# Patient Record
Sex: MALE | Race: WHITE | NOT HISPANIC OR LATINO | URBAN - METROPOLITAN AREA
[De-identification: names, ages, dates, MRNs, and addresses within clinical notes are randomized per-mention and may not be internally consistent; named-entity substitution may affect disease eponyms.]

---

## 2022-04-12 ENCOUNTER — VIRTUAL VISIT (OUTPATIENT)
Dept: UROLOGY | Facility: CLINIC | Age: 42
End: 2022-04-12
Payer: COMMERCIAL

## 2022-04-12 DIAGNOSIS — Z30.09 VASECTOMY EVALUATION: Primary | ICD-10-CM

## 2022-04-12 PROCEDURE — 99202 OFFICE O/P NEW SF 15 MIN: CPT | Mod: GT | Performed by: UROLOGY

## 2022-04-12 RX ORDER — INDOMETHACIN 50 MG/1
50 CAPSULE ORAL
COMMUNITY
Start: 2021-07-22

## 2022-04-12 RX ORDER — ALLOPURINOL 100 MG/1
300 TABLET ORAL
COMMUNITY
Start: 2021-12-08

## 2022-04-12 RX ORDER — ATENOLOL 25 MG/1
TABLET ORAL
COMMUNITY
Start: 2022-03-08

## 2022-04-12 NOTE — PROGRESS NOTES
VASECTOMY CONSULTATION NOTE  DATE OF VISIT: 4/12/2022  DC TEJEDA   PATIENT NAME: James Padilla    YOB: 1980      REASON FOR CONSULTATION: Mr. James Padilla is a 41 year old year old gentleman who is seen today requesting a vasectomy. He has 1 children - 14 year old son - and he wishes to have a vasectomy for birth control.     PAST MEDICAL HISTORY: No past medical history on file.    PAST SURGICAL HISTORY: No past surgical history on file.    MEDICATIONS:   Current Outpatient Medications:      allopurinol (ZYLOPRIM) 100 MG tablet, , Disp: , Rfl:      atenolol (TENORMIN) 25 MG tablet, , Disp: , Rfl:      indomethacin (INDOCIN) 50 MG capsule, Take 50 mg by mouth, Disp: , Rfl:     ALLERGIES:   Allergies   Allergen Reactions     Ketoconazole Rash       FAMILY HISTORY: No family history on file.    SOCIAL HISTORY:   Social History     Socioeconomic History     Marital status:      Spouse name: Not on file     Number of children: Not on file     Years of education: Not on file     Highest education level: Not on file   Occupational History     Not on file   Tobacco Use     Smoking status: Not on file     Smokeless tobacco: Not on file   Substance and Sexual Activity     Alcohol use: Not on file     Drug use: Not on file     Sexual activity: Not on file   Other Topics Concern     Not on file   Social History Narrative     Not on file     Social Determinants of Health     Financial Resource Strain: Not on file   Food Insecurity: Not on file   Transportation Needs: Not on file   Physical Activity: Not on file   Stress: Not on file   Social Connections: Not on file   Intimate Partner Violence: Not on file   Housing Stability: Not on file       PHYSICAL EXAM  Patient is a 41 year old  male   Vitals: There were no vitals taken for this visit.  There is no height or weight on file to calculate BMI.  General Appearance Adult:   Alert, no acute distress, oriented  HENT: throat/mouth:normal, good  dentition  Lungs: no respiratory distress, or pursed lip breathing  Heart: No obvious jugular venous distension present  Abdomen: obesely - distended  Musculoskeltal: extremities normal, no peripheral edema  Skin: no suspicious lesions or rashes  Neuro: Alert, oriented, speech and mentation normal  Psych: affect and mood normal  Gait: Normal     DIAGNOSIS: Request for sterilization    PLAN: The risks of the procedure as well as expectations for recovery and outcomes were explained in detail to him.  He was counseled on the risks for bleeding infection and pain after the procedure. We discussed the risk of post-vasectomy pain syndrome.  He was instructed to continue to use contraception until he had proven azoospermia on a semen specimen.  This would normally be collected at least 3 months after the procedure. Also discussed the rare, but possible risk of re-canalization of the vas, even after successful vasectomy with sterile semen specimen.  He was instructed to hold all anticoagulants medications for one week prior to the procedure.  It was recommended that he have someone else drive him home after his vasectomy.  In light of these risks and expectations he would like to proceed.  We are scheduling a vasectomy in the office in the near future.    Pt. Understands:  -1/1000-1/3000 risk of future pregnancy even with perfectly done vasectomy  -vasectomy is a permanent procedure    -he may cryopreserve sperm if he wishes   -1-5% risk of post-vasectomy pain syndrome   -1-5% risk of complication, primarily infection or bleeding  - he needs to have a semen sample that shows no sperm before getting approval for unprotected intercourse.      Thank you for the kind consultation.    Time spent: 15 minutes spent on the date of the encounter doing chart review, history and exam, documentation and further activities as noted above.     Sharad Moreno MD   Urology  [facility]  Clinic Phone  [phone]      James Padilla  who is being evaluated via a billable video visit.      How would you like to obtain your AVS? Mail a copy  If the video visit is dropped, the invitation should be resent by: Text to cell phone: [phone]  Will anyone else be joining your video visit? No    Video-Visit Details    Type of service:  Video Visit    Video Start Time: 2:18 PM    Video End Time:2:25 PM    Originating Location (pt. Location): Home    Distant Location (provider location):  [facility]     Platform used for Video Visit: Clever Machine

## 2022-05-29 ENCOUNTER — HEALTH MAINTENANCE LETTER (OUTPATIENT)
Age: 42
End: 2022-05-29

## 2022-06-06 NOTE — PROGRESS NOTES
"Assessment:      ICD-10-CM    1. Arthritis of midfoot  M19.079 meloxicam (MOBIC) 15 MG tablet   2. Capsulitis of metatarsophalangeal (MTP) joint of right foot  M77.51    3. Equinus contracture of right ankle  M24.571         Plan:  No orders of the defined types were placed in this encounter.      Discussed the etiology and treatment of the condition with the patient.  Imaging studies reviewed and discussed with the patient.  Discussed surgical and conservative options.    -Injection- x-ray guided to 3rd TMTJ if needed - his area of max pain today.   -NSAID- Rx po Meloxicam. Stop Indomethacin when taking  -Orthoses-   Custom- Rx today  -Activity- calf muscle stretching  Midfoot fusion if needed discussed      Return:  No follow-ups on file.    Solange Mccormack DPM                Chief Complaint:     Patient presents with:  Foot Problems     right foot pain    HPI:  James Padilla is a 41 year old year old male who presents for evaluation of foot pain.    Pain location- dorsal midfoot  States he has had >1 gout attack in this area  2nd toe is feeling much better, no pain currently  Did yard work & aggravated R foot recently - swollen today    Past Medical & Surgical History:  No past medical history on file.   No past surgical history on file.   No family history on file.     Social History:  ?  History   Smoking Status     Not on file   Smokeless Tobacco     Not on file     History   Drug Use Not on file     Social History    Substance and Sexual Activity      Alcohol use: Not on file      Allergies:  ?   Allergies   Allergen Reactions     Ketoconazole Rash        Medications:    Current Outpatient Medications   Medication     meloxicam (MOBIC) 15 MG tablet     allopurinol (ZYLOPRIM) 100 MG tablet     atenolol (TENORMIN) 25 MG tablet     indomethacin (INDOCIN) 50 MG capsule     No current facility-administered medications for this visit.       Physical Exam:  ?  Vitals:  /74   Ht 1.753 m (5' 9\")   Wt " 106.6 kg (235 lb)   BMI 34.70 kg/m     General:  WD/WN, in NAD.  A&O x3.  Dermatologic:    Skin is intact, open lesions absent.   Skin texture, turgor is normal.  Vascular:  Pulses palpable bilateral.  Digital capillary refill time normal bilateral.  Skin temperature is normal right.  Generalized edema- trace bilateral.  Focal edema- mild dorsal midfoot right.  Neurologic:    Gross sensation normal.  Gait and balance normal.  Musculoskeletal:  Maximal pain to palpation of dorsal midfoot over TMTJs Right.  3rd TMTJ ROM > 2nd TMTJ ROM painful, R  no pain to palpation of 2nd MPJ, ankle, sinus tarsi, right.  Lachmann drawer stable, not painful, 2nd MPJ, R  Ankle dorsiflexion <10 degrees with the knee extended, <0 degrees with the knee flexed.  Muscle strength 5/5  foot and ankle right.      Stance:  RCSP Valgus bilateral.      Imaging:   x-ray independently reviewed and interpreted by myself today.  Weight-bearing views right fot dated 4/2022, and MRI R foot reveal metatarsus adductus & flatfoot w/ 2nd TMTJ degeneration > 3rd.  2nd digit rectus.  2nd MPJ effusion & attenuation of medial capsule.  2nd TMTJ subchondral edema, mild amt to 3rd TMTJ.

## 2022-06-07 ENCOUNTER — OFFICE VISIT (OUTPATIENT)
Dept: PODIATRY | Facility: CLINIC | Age: 42
End: 2022-06-07
Payer: COMMERCIAL

## 2022-06-07 VITALS
BODY MASS INDEX: 34.8 KG/M2 | WEIGHT: 235 LBS | SYSTOLIC BLOOD PRESSURE: 122 MMHG | HEIGHT: 69 IN | DIASTOLIC BLOOD PRESSURE: 74 MMHG

## 2022-06-07 DIAGNOSIS — M24.571 EQUINUS CONTRACTURE OF RIGHT ANKLE: ICD-10-CM

## 2022-06-07 DIAGNOSIS — M19.079 ARTHRITIS OF MIDFOOT: Primary | ICD-10-CM

## 2022-06-07 DIAGNOSIS — M77.51 CAPSULITIS OF METATARSOPHALANGEAL (MTP) JOINT OF RIGHT FOOT: ICD-10-CM

## 2022-06-07 PROCEDURE — 99204 OFFICE O/P NEW MOD 45 MIN: CPT | Performed by: PODIATRIST

## 2022-06-07 RX ORDER — MELOXICAM 15 MG/1
15 TABLET ORAL DAILY
Qty: 28 TABLET | Refills: 1 | Status: SHIPPED | OUTPATIENT
Start: 2022-06-07 | End: 2022-06-14

## 2022-06-07 NOTE — PATIENT INSTRUCTIONS
PATIENT INSTRUCTIONS - Podiatry / Foot & Ankle Surgery      Meloxicam- 1 pill daily x1 week, then take a 1 week break.  Repeat as needed.  Take with food & an acid blocker if stomach upset.  Stop all other NSAIDs (aspirin, ibuprofen/Motrin, naproxen/ Aleve).      [place] [facility] LOCATIONS  [facility]  [address]  [place] [address]  Phone: [phone]  Fax: [phone] [facility]  [address]  [place] [address]  Phone: [phone]   Fax: [phone]   [facility]  [address]  [place] [address]  Phone: [phone]  Fax: [phone] [facility]  [address]  [place] [address]  Phone: [phone]   Fax: [phone]   [facility]   [address]  [place] [address]  Phone: [phone]  Fax: [phone] * Please call any location listed to make an appointment for a casting/fitting. Your referral was sent to their central office and they will all have the order on file.         Calf muscle stretching 2-3x daily as instructed, both with the knees straight and the knees bent. Hold for 30-60 seconds.  For personal instruction on this,  please request a Physical Therapy referral from your doctor.      X-ray guided injection to midfoot - Right   Call for procedural slot

## 2022-06-07 NOTE — LETTER
6/7/2022         RE: James Padilla  [address] [facility] [address]        Dear Colleague,    Thank you for referring your patient, James Padilla, to the [facility]. Please see a copy of my visit note below.    Assessment:      ICD-10-CM    1. Arthritis of midfoot  M19.079 meloxicam (MOBIC) 15 MG tablet   2. Capsulitis of metatarsophalangeal (MTP) joint of right foot  M77.51    3. Equinus contracture of right ankle  M24.571         Plan:  No orders of the defined types were placed in this encounter.      Discussed the etiology and treatment of the condition with the patient.  Imaging studies reviewed and discussed with the patient.  Discussed surgical and conservative options.    -Injection- x-ray guided to 3rd TMTJ if needed - his area of max pain today.   -NSAID- Rx po Meloxicam. Stop Indomethacin when taking  -Orthoses-   Custom- Rx today  -Activity- calf muscle stretching  Midfoot fusion if needed discussed      Return:  No follow-ups on file.    Solange Mccormack DPM                Chief Complaint:     Patient presents with:  Foot Problems     right foot pain    HPI:  James Padilla is a 41 year old year old male who presents for evaluation of foot pain.    Pain location- dorsal midfoot  States he has had >1 gout attack in this area  2nd toe is feeling much better, no pain currently  Did yard work & aggravated R foot recently - swollen today    Past Medical & Surgical History:  No past medical history on file.   No past surgical history on file.   No family history on file.     Social History:  ?  History   Smoking Status     Not on file   Smokeless Tobacco     Not on file     History   Drug Use Not on file     Social History    Substance and Sexual Activity      Alcohol use: Not on file      Allergies:  ?   Allergies   Allergen Reactions     Ketoconazole Rash        Medications:    Current Outpatient Medications   Medication     meloxicam (MOBIC) 15 MG  "tablet     allopurinol (ZYLOPRIM) 100 MG tablet     atenolol (TENORMIN) 25 MG tablet     indomethacin (INDOCIN) 50 MG capsule     No current facility-administered medications for this visit.       Physical Exam:  ?  Vitals:  /74   Ht 1.753 m (5' 9\")   Wt 106.6 kg (235 lb)   BMI 34.70 kg/m     General:  WD/WN, in NAD.  A&O x3.  Dermatologic:    Skin is intact, open lesions absent.   Skin texture, turgor is normal.  Vascular:  Pulses palpable bilateral.  Digital capillary refill time normal bilateral.  Skin temperature is normal right.  Generalized edema- trace bilateral.  Focal edema- mild dorsal midfoot right.  Neurologic:    Gross sensation normal.  Gait and balance normal.  Musculoskeletal:  Maximal pain to palpation of dorsal midfoot over TMTJs Right.  3rd TMTJ ROM > 2nd TMTJ ROM painful, R  no pain to palpation of 2nd MPJ, ankle, sinus tarsi, right.  Lachmann drawer stable, not painful, 2nd MPJ, R  Ankle dorsiflexion <10 degrees with the knee extended, <0 degrees with the knee flexed.  Muscle strength 5/5  foot and ankle right.      Stance:  RCSP Valgus bilateral.      Imaging:   x-ray independently reviewed and interpreted by myself today.  Weight-bearing views right fot dated 4/2022, and MRI R foot reveal metatarsus adductus & flatfoot w/ 2nd TMTJ degeneration > 3rd.  2nd digit rectus.  2nd MPJ effusion & attenuation of medial capsule.  2nd TMTJ subchondral edema, mild amt to 3rd TMTJ.                  Again, thank you for allowing me to participate in the care of your patient.        Sincerely,        Solange Mccormack DPM    "

## 2022-06-22 ENCOUNTER — OFFICE VISIT (OUTPATIENT)
Dept: PODIATRY | Facility: CLINIC | Age: 42
End: 2022-06-22
Payer: COMMERCIAL

## 2022-06-22 VITALS — DIASTOLIC BLOOD PRESSURE: 74 MMHG | SYSTOLIC BLOOD PRESSURE: 122 MMHG

## 2022-06-22 DIAGNOSIS — M19.079 ARTHRITIS OF MIDFOOT: Primary | ICD-10-CM

## 2022-06-22 PROCEDURE — 20600 DRAIN/INJ JOINT/BURSA W/O US: CPT | Mod: RT | Performed by: PODIATRIST

## 2022-06-22 RX ORDER — BUPIVACAINE HYDROCHLORIDE 5 MG/ML
1 INJECTION, SOLUTION PERINEURAL ONCE
Status: COMPLETED | OUTPATIENT
Start: 2022-06-22 | End: 2022-06-22

## 2022-06-22 RX ORDER — DEXAMETHASONE SODIUM PHOSPHATE 4 MG/ML
4 INJECTION, SOLUTION INTRA-ARTICULAR; INTRALESIONAL; INTRAMUSCULAR; INTRAVENOUS; SOFT TISSUE ONCE
Status: COMPLETED | OUTPATIENT
Start: 2022-06-22 | End: 2022-06-22

## 2022-06-22 RX ADMIN — BUPIVACAINE HYDROCHLORIDE 5 MG: 5 INJECTION, SOLUTION PERINEURAL at 14:26

## 2022-06-22 RX ADMIN — DEXAMETHASONE SODIUM PHOSPHATE 4 MG: 4 INJECTION, SOLUTION INTRA-ARTICULAR; INTRALESIONAL; INTRAMUSCULAR; INTRAVENOUS; SOFT TISSUE at 17:07

## 2022-06-22 NOTE — PATIENT INSTRUCTIONS
"PATIENT INSTRUCTIONS - Podiatry / Foot & Ankle Surgery        Aftercare for Steroid Injection  Activity:  -Resume normal activity as tolerated.  -Tendon, ligament, fascia injectionons- avoid high-impact activity for 1 week.  Icing:  -May apply to the injection site if needed.  Infection:  -Risk is generally low (<1%), but must be aware of the signs/symptoms.    -Both infection and an inflammatory reaction to the steroid (\"steroid flare\") will cause redness, warmth, and increased pain.   -If these symptoms develop within 12-24h & resolve within 2-3 days- \"steroid flare\"- ice (non-worrisome)  -If these symptoms develop after 24-48h, progressively get worse, & are associated with a fever- possible infection; call the clinic or present to urgent care immediately      "

## 2022-06-22 NOTE — PROGRESS NOTES
Assessment:      ICD-10-CM    1. Arthritis of midfoot  M19.079 XR Foot Right 2 Views     DRAIN/INJECT SMALL JOINT/BURSA     FLUOROSCOPIC GUIDANCE NEEDLE PLACEMENT     dexamethasone (DECADRON) injection 4 mg     Bupivacaine 0.5 % Injection        Plan:  Orders Placed This Encounter   Procedures     DRAIN/INJECT SMALL JOINT/BURSA     FLUOROSCOPIC GUIDANCE NEEDLE PLACEMENT     XR Foot Right 2 Views       Discussed the etiology and treatment of the condition with the patient.  Imaging studies reviewed and discussed with the patient.  Discussed surgical and conservative options.        Procedure: X-ray guided injection  PARQ session held, verbal consent obtained.  Field block just proximal joint for anesthesia & hemostasis using 2.5ml 2% lidocaine with Epi.  Sterile skin prep.    X-ray utilized to localized the affected small joint in the foot.   Imaging is necessary due the close anatomic proximity of the foot joints making landmark guided injection not reliable.  Location:  2nd TMTJ  Contents:  2ml total, marcaine, dexamethasone phosphate.  Dressing applied.    Post-injection instructions reviewed including close attention to amount and duration of pain relief.      Return:  No follow-ups on file.    Solange Mccormack DPM                Chief Complaint:     Patient presents with:  RECHECK     right foot pain    HPI:  James Padilla is a 41 year old year old male who presents for evaluation of foot pain.    Pain location- dorsal midfoot  States he has had >1 gout attack in this area  2nd toe is feeling much better, no pain currently  Did yard work & aggravated R foot recently - swollen today    Past Medical & Surgical History:  No past medical history on file.   No past surgical history on file.   No family history on file.     Social History:  ?  History   Smoking Status     Not on file   Smokeless Tobacco     Not on file     History   Drug Use Not on file     Social History    Substance and Sexual Activity       Alcohol use: Not on file      Allergies:  ?   Allergies   Allergen Reactions     Ketoconazole Rash        Medications:    Current Outpatient Medications   Medication     allopurinol (ZYLOPRIM) 100 MG tablet     atenolol (TENORMIN) 25 MG tablet     indomethacin (INDOCIN) 50 MG capsule     meloxicam (MOBIC) 15 MG tablet     Current Facility-Administered Medications   Medication     dexamethasone (DECADRON) injection 4 mg       Physical Exam:  ?  Vitals:  /74    General:  WD/WN, in NAD.  A&O x3.  Dermatologic:    Skin is intact, open lesions absent.   Skin texture, turgor is normal.  Vascular:  Pulses palpable bilateral.  Digital capillary refill time normal bilateral.  Skin temperature is normal right.  Generalized edema- trace bilateral.  Focal edema- mild dorsal midfoot right.  Neurologic:    Gross sensation normal.  Gait and balance normal.  Musculoskeletal:  Maximal pain to palpation of dorsal midfoot over TMTJs Right.  2nd TMTJ ROM painful, R  no pain to palpation of 2nd MPJ, ankle, sinus tarsi, right.  Lachmann drawer stable, not painful, 2nd MPJ, R  Ankle dorsiflexion <10 degrees with the knee extended, <0 degrees with the knee flexed.  Muscle strength 5/5  foot and ankle right.      Stance:  RCSP Valgus bilateral.      Imaging:   x-ray independently reviewed and interpreted by myself today.  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  2nd digit rectus.  2nd MPJ effusion & attenuation of medial capsule.  2nd TMTJ subchondral edema, mild amt to 3rd TMTJ.

## 2022-06-22 NOTE — LETTER
6/22/2022         RE: James Padilla  [address] [facility] [address]        Dear Colleague,    Thank you for referring your patient, James Padilla, to the [facility]. Please see a copy of my visit note below.    Assessment:      ICD-10-CM    1. Arthritis of midfoot  M19.079 XR Foot Right 2 Views        Plan:  Orders Placed This Encounter   Procedures     XR Foot Right 2 Views       Discussed the etiology and treatment of the condition with the patient.  Imaging studies reviewed and discussed with the patient.  Discussed surgical and conservative options.        Procedure: X-ray guided injection  PARQ session held, verbal consent obtained.  Field block just proximal joint for anesthesia & hemostasis using 2.5ml 2% lidocaine with Epi.  Sterile skin prep.    X-ray utilized to localized the affected small joint in the foot.   Imaging is necessary due the close anatomic proximity of the foot joints making landmark guided injection not reliable.  Location:  2nd TMTJ  Contents:  2ml total, marcaine, dexamethasone phosphate.  Dressing applied.    Post-injection instructions reviewed including close attention to amount and duration of pain relief.      Return:  No follow-ups on file.    Solange Mccormack DPM                Chief Complaint:     No chief complaint on file.     right foot pain    HPI:  James Padilla is a 41 year old year old male who presents for evaluation of foot pain.    Pain location- dorsal midfoot  States he has had >1 gout attack in this area  2nd toe is feeling much better, no pain currently  Did yard work & aggravated R foot recently - swollen today    Past Medical & Surgical History:  No past medical history on file.   No past surgical history on file.   No family history on file.     Social History:  ?  History   Smoking Status     Not on file   Smokeless Tobacco     Not on file     History   Drug Use Not on file     Social History    Substance and Sexual  Activity      Alcohol use: Not on file      Allergies:  ?   Allergies   Allergen Reactions     Ketoconazole Rash        Medications:    Current Outpatient Medications   Medication     allopurinol (ZYLOPRIM) 100 MG tablet     atenolol (TENORMIN) 25 MG tablet     indomethacin (INDOCIN) 50 MG capsule     meloxicam (MOBIC) 15 MG tablet     No current facility-administered medications for this visit.       Physical Exam:  ?  Vitals:  There were no vitals taken for this visit.   General:  WD/WN, in NAD.  A&O x3.  Dermatologic:    Skin is intact, open lesions absent.   Skin texture, turgor is normal.  Vascular:  Pulses palpable bilateral.  Digital capillary refill time normal bilateral.  Skin temperature is normal right.  Generalized edema- trace bilateral.  Focal edema- mild dorsal midfoot right.  Neurologic:    Gross sensation normal.  Gait and balance normal.  Musculoskeletal:  Maximal pain to palpation of dorsal midfoot over TMTJs Right.  2nd TMTJ ROM painful, R  no pain to palpation of 2nd MPJ, ankle, sinus tarsi, right.  Lachmann drawer stable, not painful, 2nd MPJ, R  Ankle dorsiflexion <10 degrees with the knee extended, <0 degrees with the knee flexed.  Muscle strength 5/5  foot and ankle right.      Stance:  RCSP Valgus bilateral.      Imaging:   x-ray independently reviewed and interpreted by myself today.  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  2nd digit rectus.  2nd MPJ effusion & attenuation of medial capsule.  2nd TMTJ subchondral edema, mild amt to 3rd TMTJ.                Again, thank you for allowing me to participate in the care of your patient.        Sincerely,        Solange Mccormack DPM

## 2022-06-29 ENCOUNTER — MYC MEDICAL ADVICE (OUTPATIENT)
Dept: PODIATRY | Facility: CLINIC | Age: 42
End: 2022-06-29

## 2022-06-30 NOTE — TELEPHONE ENCOUNTER
I used Dexamethasone only, I did not use a crystalline steroid (Kenalog) that can trigger a steroid flare.    I have never heard of a gout attack after a clear / non-crystalline steroid.    I am glad it is better today, if it has not improved by tomorrow please have him see me at [place].    You are free to double book him over any existing patient if necessary.    Solange Mccormack, TREVOR

## 2022-06-30 NOTE — TELEPHONE ENCOUNTER
Phone call to patient and provided recommendations from Dr. Mccormack.   He asks if this is something we typically see that we have trouble diagnosing. He has not had this severe of a flare before.   Discussed that it may be a good idea to go ahead and schedule a follow up appointment and discuss his concerns further and to be re-evaluated.   He was in agreement.   Appointment scheduled at the [facility] location 7/1/22 . Asked that he arrive between 8:15 and 8:30 and that he was being double booked and worked in, so he may have to wait a bit to be seen. He verbalized understanding.     JAIDEN Ferraro RN

## 2022-06-30 NOTE — TELEPHONE ENCOUNTER
Please see Mychart message.   There is no consent to communicate on file.   Left voicemail asking for a return call and replied to [employer] message asking for a return call.     JAIDEN Ferraro RN

## 2022-06-30 NOTE — TELEPHONE ENCOUNTER
Please see [employer] message.   Patient had xray guided cortisone injection of 2nd TMTJ on 6/22.22 by Dr. Mccormack.     Patient returns call.   He states that on the evening of 6/28/22 his foot started to become red and painful. In the am of 6/29/22 the pain, swelling and redness were worse. The redness involves the whole front half of his foot, arch and toes. His foot is swollen and it is painful to bear weight.   Today the redness is less in intensity and pain is a little better. He feels it is just like his gout attacks in the past.   Will discuss with provider for recommendations and get back with him.     Ok to leave message: YES    Please advise.     JAIDEN Ferraro RN

## 2022-07-05 ENCOUNTER — MYC MEDICAL ADVICE (OUTPATIENT)
Dept: PODIATRY | Facility: CLINIC | Age: 42
End: 2022-07-05

## 2022-07-08 ENCOUNTER — OFFICE VISIT (OUTPATIENT)
Dept: PODIATRY | Facility: CLINIC | Age: 42
End: 2022-07-08
Payer: COMMERCIAL

## 2022-07-08 VITALS — HEART RATE: 84 BPM | DIASTOLIC BLOOD PRESSURE: 88 MMHG | SYSTOLIC BLOOD PRESSURE: 126 MMHG

## 2022-07-08 DIAGNOSIS — M19.079 ARTHRITIS OF MIDFOOT: Primary | ICD-10-CM

## 2022-07-08 DIAGNOSIS — M24.571 EQUINUS CONTRACTURE OF RIGHT ANKLE: ICD-10-CM

## 2022-07-08 DIAGNOSIS — M77.51 CAPSULITIS OF METATARSOPHALANGEAL (MTP) JOINT OF RIGHT FOOT: ICD-10-CM

## 2022-07-08 PROCEDURE — 99214 OFFICE O/P EST MOD 30 MIN: CPT | Performed by: PODIATRIST

## 2022-07-08 NOTE — PATIENT INSTRUCTIONS
"PATIENT INSTRUCTIONS - Podiatry / Foot & Ankle Surgery    [facility]  [facility]  [address]  [place] [address]  Phone: [phone]  Fax: [phone] [facility]  [address]  [place] [address]  Phone: [phone]   Fax: [phone]   [facility]  [address]  [place] [address]  Phone: [phone]  Fax: [phone] [facility]  [address]  [place] [address]  Phone: [phone]   Fax: [phone]   [facility]   [address]  [place] [address]  Phone: [phone]  Fax: [phone] * Please call any location listed to make an appointment for a casting/fitting. Your referral was sent to their central office and they will all have the order on file.         Diclofenac Gel- Apply to affected area only.  Apply 3-4 times daily for the first 3-4 days, then 1-2 times daily as needed.       Stop Meloxicam for precedure  Call if oral Diclofenac (alternative to meloxicam) prescription is desired        Inside shoes w/ orthotic footbed:  Samy, Eliza, Halflinger, Mekhi, Ketracie, etc  or similar    Place metatarsal pads on an orthotic or shoe liner, adhesive side down.    The pad should contact your foot BEHIND the metatarsophalangeal joints (\"MPJs\" or \"ball\") of your foot.   If the pad sits beneath the joints themselves, it may actually increase pain.  Use glue to prevent shifting of the metatarsal pads on the orthotic / shoe liner.  Available on [email or web address] (Blue PPT metatarsal pads) or amazon        "

## 2022-07-08 NOTE — PROGRESS NOTES
Assessment:      ICD-10-CM    1. Arthritis of midfoot  M19.079 [facility] Orth[facility]; Foot Orthotics   2. Capsulitis of metatarsophalangeal (MTP) joint of right foot  M77.51 [facility] Orth[facility]; Foot Orthotics   3. Equinus contracture of right ankle  M24.571 [facility] Orth[facility]; Foot Orthotics        Plan:  Orders Placed This Encounter   Procedures     [facility] DME Orthotic; Foot Orthotics       Discussed the etiology and treatment of the condition with the patient.  Imaging studies reviewed and discussed with the patient.  Discussed surgical and conservative options.    Pain in midfoot improved, not gone  2nd MPJ still painful    -Rx orthotics today  - met pad & arch for arthritis  -NSAID - call if diclofenac Rx desired  -Surgical - midfoot fusion, 2nd MPJ repair/ decompression discussed      Return:  No follow-ups on file.    Solange Mccormack DPM                Chief Complaint:     Patient presents with:  Right Foot - Pain, RECHECK: Had injection, had 2 good days after still not improved ball of foot still sore      right foot pain    HPI:  James Padilla is a 41 year old year old male who presents for evaluation of foot pain.    Pain location- dorsal midfoot  States he has had >1 gout attack in this area  2nd toe is feeling much better, no pain currently  Did yard work & aggravated R foot recently - swollen today    Past Medical & Surgical History:  No past medical history on file.   No past surgical history on file.   No family history on file.     Social History:  ?  History   Smoking Status     Current Every Day Smoker     Types: Cigarettes   Smokeless Tobacco     Never Used     History   Drug Use Not on file     Social History    Substance and Sexual Activity      Alcohol use: Not on file      Allergies:  ?   Allergies   Allergen Reactions     Ketoconazole Rash        Medications:    Current Outpatient Medications   Medication      allopurinol (ZYLOPRIM) 100 MG tablet     atenolol (TENORMIN) 25 MG tablet     indomethacin (INDOCIN) 50 MG capsule     meloxicam (MOBIC) 15 MG tablet     No current facility-administered medications for this visit.       Physical Exam:  ?  Vitals:  /88   Pulse 84    General:  WD/WN, in NAD.  A&O x3.  Dermatologic:    Skin is intact, open lesions absent.   Skin texture, turgor is normal.  Vascular:  Pulses palpable bilateral.  Digital capillary refill time normal bilateral.  Skin temperature is normal right.  Generalized edema- trace bilateral.  Focal edema- mild dorsal midfoot right.  Neurologic:    Gross sensation normal.  Gait and balance normal.  Musculoskeletal:  Moderate pain to palpation of dorsal midfoot over 2nd  TMTJ, sub 2nd MPJ, Right.  2nd TMTJ ROM painful, R  2nd MPJ ROM mildly painful, R  Lachmann drawer stable, not painful, 2nd MPJ, R  Ankle dorsiflexion <10 degrees with the knee extended, <0 degrees with the knee flexed.  Muscle strength 5/5  foot and ankle right.      Stance:  RCSP Valgus bilateral.      Imaging:   x-ray independently reviewed and interpreted by myself today.  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  2nd digit rectus.  2nd MPJ effusion & attenuation of medial capsule.  2nd TMTJ subchondral edema, mild amt to 3rd TMTJ.

## 2022-07-08 NOTE — LETTER
7/8/2022         RE: James Padilla  [address] [facility] [address]        Dear Colleague,    Thank you for referring your patient, James Padilla, to the [facility]. Please see a copy of my visit note below.    Assessment:      ICD-10-CM    1. Arthritis of midfoot  M19.079 [facility] D[facility] Orthotic; Foot Orthotics   2. Capsulitis of metatarsophalangeal (MTP) joint of right foot  M77.51 [facility] DME Orthotic; Foot Orthotics   3. Equinus contracture of right ankle  M24.571 [facility] DME Orthotic; Foot Orthotics        Plan:  Orders Placed This Encounter   Procedures     [facility]otics and Prosthetics DME Orthotic; Foot Orthotics       Discussed the etiology and treatment of the condition with the patient.  Imaging studies reviewed and discussed with the patient.  Discussed surgical and conservative options.    Pain in midfoot improved, not gone  2nd MPJ still painful    -Rx orthotics today  - met pad & arch for arthritis  -NSAID - call if diclofenac Rx desired  -Surgical - midfoot fusion, 2nd MPJ repair/ decompression discussed      Return:  No follow-ups on file.    Solange Mccormack DPM                Chief Complaint:     Patient presents with:  Right Foot - Pain, RECHECK: Had injection, had 2 good days after still not improved ball of foot still sore      right foot pain    HPI:  James Padilla is a 41 year old year old male who presents for evaluation of foot pain.    Pain location- dorsal midfoot  States he has had >1 gout attack in this area  2nd toe is feeling much better, no pain currently  Did yard work & aggravated R foot recently - swollen today    Past Medical & Surgical History:  No past medical history on file.   No past surgical history on file.   No family history on file.     Social History:  ?  History   Smoking Status     Current Every Day Smoker     Types: Cigarettes   Smokeless Tobacco     Never Used     History    Drug Use Not on file     Social History    Substance and Sexual Activity      Alcohol use: Not on file      Allergies:  ?   Allergies   Allergen Reactions     Ketoconazole Rash        Medications:    Current Outpatient Medications   Medication     allopurinol (ZYLOPRIM) 100 MG tablet     atenolol (TENORMIN) 25 MG tablet     indomethacin (INDOCIN) 50 MG capsule     meloxicam (MOBIC) 15 MG tablet     No current facility-administered medications for this visit.       Physical Exam:  ?  Vitals:  /88   Pulse 84    General:  WD/WN, in NAD.  A&O x3.  Dermatologic:    Skin is intact, open lesions absent.   Skin texture, turgor is normal.  Vascular:  Pulses palpable bilateral.  Digital capillary refill time normal bilateral.  Skin temperature is normal right.  Generalized edema- trace bilateral.  Focal edema- mild dorsal midfoot right.  Neurologic:    Gross sensation normal.  Gait and balance normal.  Musculoskeletal:  Moderate pain to palpation of dorsal midfoot over 2nd  TMTJ, sub 2nd MPJ, Right.  2nd TMTJ ROM painful, R  2nd MPJ ROM mildly painful, R  Lachmann drawer stable, not painful, 2nd MPJ, R  Ankle dorsiflexion <10 degrees with the knee extended, <0 degrees with the knee flexed.  Muscle strength 5/5  foot and ankle right.      Stance:  RCSP Valgus bilateral.      Imaging:   x-ray independently reviewed and interpreted by myself today.  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  2nd digit rectus.  2nd MPJ effusion & attenuation of medial capsule.  2nd TMTJ subchondral edema, mild amt to 3rd TMTJ.                Again, thank you for allowing me to participate in the care of your patient.        Sincerely,        Solange Mccormack DPM

## 2022-07-15 ENCOUNTER — OFFICE VISIT (OUTPATIENT)
Dept: UROLOGY | Facility: CLINIC | Age: 42
End: 2022-07-15
Payer: COMMERCIAL

## 2022-07-15 DIAGNOSIS — Z30.2 ENCOUNTER FOR STERILIZATION: Primary | ICD-10-CM

## 2022-07-15 PROCEDURE — 88302 TISSUE EXAM BY PATHOLOGIST: CPT | Performed by: PATHOLOGY

## 2022-07-15 PROCEDURE — 55250 REMOVAL OF SPERM DUCT(S): CPT | Performed by: UROLOGY

## 2022-07-15 NOTE — LETTER
Date:July 15, 2022      Provider requested that no letter be sent. Do not send.       [facility]

## 2022-07-15 NOTE — PROGRESS NOTES
OFFICE VASECTOMY OPERATIVE NOTE  [facility]    DATE: 07/15/22  PATIENT: James Padilla    YOB: 1980    James Padilla is a 41 year old male.  He has 0 children and he wishes a vasectomy for birth control.  He has read the brochure and he has shaved himself.  I reviewed the vasectomy procedure with him explaining that it would be done with a local anesthetic given just in the location where the vasectomy would be done.  It would be done through incisions with the removal of segments of the vasa, cauterization of the ends, and burying the ends separate with sutures.      Pt. Understands:  -1/1000-1/3000 risk of future pregnancy even with perfectly done vasectomy  -vasectomy is a permanent procedure    -he may cryopreserve sperm if he wishes   -1-5% risk of post-vasectomy pain syndrome   -1-5% risk of complication, primarily infection or bleeding  - he needs to have a semen sample that shows no sperm before getting approval for unprotected intercourse.      Complications such as bleeding, infection, and damage to other tissues in the area were discussed. I recommended that an ice bag be placed on the scrotum off and on tonight to help reduce pain and swelling.      He was reminded that he was not sterile immediately after the vasectomy that it would take at least 20 ejaculations to empty the vas of any remaining sperm.  He was not to provide a semen sample until after the 20th ejaculation and not before 12 weeks after the vas. He was  to fulfill both of those requirements.   He understands it is his responsibility to find out the results of the vas before proceeding with intercourse without birth control protection.  Other items discussed were activity afterwards, returning to work, voluntary physical activity,  resuming sexual activity, clothing to wear, bathing, and care of the vas site and expected changes in the site as healing progresses.  After signing the permit, bilateral vasectomy was done as  described below.     ANESTHESIA: Local    DETAILS OF PROCEDURE: The risks of the procedure were explained in detail to the patient and informed consent was obtained. The patient was placed supine on the procedure table and the penis and scrotum were prepped and draped in the standard sterile fashion. The right vas deferens was isolated and brought up to the skin. 1% lidocaine local anesthesia was used to infiltrate the skin and the spermatic cord. A small incision was created and adventitial tissues were swept away from the vas. A 1 cm segment of the vas was excised and sent for pathology. The proximal and distal lumina of the vas were cauterized and then each segment was tied off in a knuckling-fashion with a 3-0 vicryl suture. Hemostasis was ensured and the segments were released back into the scrotum. Meticulous hemostasis was achieved. The skin was closed with 3-0 chromic suture in a horizontal mattress fashion. Next the left vas was brought to the skin and a vasectomy was performed in the similar fashion. The skin was closed with 3-0 chromic suture in a horizontal mattress fashion.    COMPLICATIONS: None    TAKE HOME MEDICATIONS: Tylenol every 6 hours, PRN    DISMISSAL INSTRUCTIONS:  - Ice pack to scrotum 15 to 20 minutes each hour awake for 36 to 40 hours.  - No strenuous activity or ejaculation for at least 7 days.  - No unprotected sexual activity until proven azoospermia on semen samples at 3 months.  - Referred to patient handout for normal postop expectations and indications to contact nurse or physician.    M.D.: Sharad Moreno MD

## 2022-07-15 NOTE — PROGRESS NOTES
Pt has signed the consent form today confirming that a VASECTOMY is the correct procedure today. I verbally confirmed the patient s identity using two indicators, that the pt has started any medication as prescribed for this procedure, relevant allergies, that they have not used blood thinning products in the last 7 - 10 days, and that the correct equipment was available. Immediately prior to starting the procedure I conducted the Time Out with the MD and re-confirmed the patient s name and procedure. Pathology ordered and sent to lab. Post-procedure information, also specimen collection cup with instructions, given to pt at time of check out.    The following medication was given:     MEDICATION:  Xylocaine 2%   ROUTE: infiltration  SITE: R AND L vas deferens  DOSE: 400mg/20ML  LOT #: [id number]  : [employer]  EXPIRATION DATE: 3/26  NDC#: 63658-376-74   Was there drug waste? Yes  Amount of drug waste (mL): 5.  Reason for waste:  Single use vial  Multi-dose vial: No    SHARON Bosch CMA

## 2022-07-15 NOTE — LETTER
7/15/2022       RE: James Padilla  [address]  [facility] [address]     Dear Colleague,    Thank you for referring your patient, James Padilla, to the [facility]. Please see a copy of my visit note below.    OFFICE VASECTOMY OPERATIVE NOTE  [facility]    DATE: 07/15/22  PATIENT: James Padilla    YOB: 1980    James Padilla is a 41 year old male.  He has 0 children and he wishes a vasectomy for birth control.  He has read the brochure and he has shaved himself.  I reviewed the vasectomy procedure with him explaining that it would be done with a local anesthetic given just in the location where the vasectomy would be done.  It would be done through incisions with the removal of segments of the vasa, cauterization of the ends, and burying the ends separate with sutures.      Pt. Understands:  -1/1000-1/3000 risk of future pregnancy even with perfectly done vasectomy  -vasectomy is a permanent procedure    -he may cryopreserve sperm if he wishes   -1-5% risk of post-vasectomy pain syndrome   -1-5% risk of complication, primarily infection or bleeding  - he needs to have a semen sample that shows no sperm before getting approval for unprotected intercourse.      Complications such as bleeding, infection, and damage to other tissues in the area were discussed. I recommended that an ice bag be placed on the scrotum off and on tonight to help reduce pain and swelling.      He was reminded that he was not sterile immediately after the vasectomy that it would take at least 20 ejaculations to empty the vas of any remaining sperm.  He was not to provide a semen sample until after the 20th ejaculation and not before 12 weeks after the vas. He was  to fulfill both of those requirements.   He understands it is his responsibility to find out the results of the vas before proceeding with intercourse without birth  control protection.  Other items discussed were activity afterwards, returning to work, voluntary physical activity,  resuming sexual activity, clothing to wear, bathing, and care of the vas site and expected changes in the site as healing progresses.  After signing the permit, bilateral vasectomy was done as described below.     ANESTHESIA: Local    DETAILS OF PROCEDURE: The risks of the procedure were explained in detail to the patient and informed consent was obtained. The patient was placed supine on the procedure table and the penis and scrotum were prepped and draped in the standard sterile fashion. The right vas deferens was isolated and brought up to the skin. 1% lidocaine local anesthesia was used to infiltrate the skin and the spermatic cord. A small incision was created and adventitial tissues were swept away from the vas. A 1 cm segment of the vas was excised and sent for pathology. The proximal and distal lumina of the vas were cauterized and then each segment was tied off in a knuckling-fashion with a 3-0 vicryl suture. Hemostasis was ensured and the segments were released back into the scrotum. Meticulous hemostasis was achieved. The skin was closed with 3-0 chromic suture in a horizontal mattress fashion. Next the left vas was brought to the skin and a vasectomy was performed in the similar fashion. The skin was closed with 3-0 chromic suture in a horizontal mattress fashion.    COMPLICATIONS: None    TAKE HOME MEDICATIONS: Tylenol every 6 hours, PRN    DISMISSAL INSTRUCTIONS:  - Ice pack to scrotum 15 to 20 minutes each hour awake for 36 to 40 hours.  - No strenuous activity or ejaculation for at least 7 days.  - No unprotected sexual activity until proven azoospermia on semen samples at 3 months.  - Referred to patient handout for normal postop expectations and indications to contact nurse or physician.    M.D.: Sharad Moreno MD          Again, thank you for allowing me to participate in the care  of your patient.      Sincerely,    Sharad Moreno MD

## 2022-07-15 NOTE — PATIENT INSTRUCTIONS
POST VASECTOMY INSTRUCTIONS    1.) If you have any concerns or questions, please contact our office at [phone] and choose option 2.      2.) It is okay to take a shower, however, do not soak in water (bath,swimming, hot tub,etc....) until your incision is healed.    3.) You might notice some swelling, mild bruising, and discomfort for several days after your vasectomy. This is to be expected. For at least the next 24 hours, an ice pack should be applied for 20 minutes every hour that you are awake. Ice will help with discomfort and swelling. Do not place directly on the skin.    4.) No intercourse, strenuous activity or exercise for at least 7-10 days, even if you feel fine.    5.) You need to wear good scrotal support while you are healing. We strongly recommend an athletic supporter or a pair of regular briefs that are one size too small. Boxer briefs do not offer enough support.    6.) Tylenol as directed on the bottle is preferred for discomfort. Please avoid any blood thinning products such as ibuprofen and aspirin (Motrin, Advil, Excedrin, Aleve, ect..) for at least the next week.    7.) It is normal to have mild drainage from the incision area for several days. However, please contact our office if you notice: bright red blood that does not stop after three days, increased pain, heat at the incision, red streaks, foul smelling discharge, or if you start to run a fever.     8.) YOU MUST CONTINUE BIRTH CONTROL UNTIL WE CONFIRM YOUR STERILITY.  This process can take up to a year to complete (rare occurrence).     9.) You have been given a form with specimen cup and instructions for your follow up specimen. You will be cleared once we receive ONE negative specimen. If your specimen comes back positive (sperm seen) you will be asked to repeat the test. This does not mean that your vasectomy has failed.

## 2022-07-19 LAB
PATH REPORT.COMMENTS IMP SPEC: NORMAL
PATH REPORT.COMMENTS IMP SPEC: NORMAL
PATH REPORT.FINAL DX SPEC: NORMAL
PATH REPORT.GROSS SPEC: NORMAL
PATH REPORT.MICROSCOPIC SPEC OTHER STN: NORMAL
PATH REPORT.RELEVANT HX SPEC: NORMAL
PHOTO IMAGE: NORMAL

## 2022-07-22 ENCOUNTER — OFFICE VISIT (OUTPATIENT)
Dept: PODIATRY | Facility: CLINIC | Age: 42
End: 2022-07-22
Payer: COMMERCIAL

## 2022-07-22 VITALS
SYSTOLIC BLOOD PRESSURE: 122 MMHG | WEIGHT: 247 LBS | BODY MASS INDEX: 36.48 KG/M2 | DIASTOLIC BLOOD PRESSURE: 80 MMHG | HEART RATE: 76 BPM

## 2022-07-22 DIAGNOSIS — M21.621 TAILOR'S BUNIONETTE, RIGHT: ICD-10-CM

## 2022-07-22 DIAGNOSIS — M77.51 CAPSULITIS OF METATARSOPHALANGEAL (MTP) JOINT OF RIGHT FOOT: ICD-10-CM

## 2022-07-22 DIAGNOSIS — M19.079 ARTHRITIS OF MIDFOOT: Primary | ICD-10-CM

## 2022-07-22 PROCEDURE — 99214 OFFICE O/P EST MOD 30 MIN: CPT | Performed by: PODIATRIST

## 2022-07-22 ASSESSMENT — PAIN SCALES - GENERAL: PAINLEVEL: NO PAIN (1)

## 2022-07-22 NOTE — LETTER
7/22/2022         RE: James Padilla  [address] [facility] [address]        Dear Colleague,    Thank you for referring your patient, James Padilla, to the [facility]. Please see a copy of my visit note below.    Assessment:      ICD-10-CM    1. Arthritis of midfoot  M19.079    2. Capsulitis of metatarsophalangeal (MTP) joint of right foot  M77.51    3. Tailor's bunionette, right  M21.621         Plan:  No orders of the defined types were placed in this encounter.      Discussed the etiology and treatment of the condition with the patient.  Imaging studies reviewed and discussed with the patient.  Discussed surgical and conservative options.      Midfoot arthritis & prior gout attacks is priority  Tailor's bunion - on & off  2nd MPJ - came after midfoot    -Surgical - midfoot fusion, tailor's bunionectomy, 2nd MPJ repair / decompression discussed  -Continue current care        Return:  No follow-ups on file.    Solange Mccormack DPM                Chief Complaint:     No chief complaint on file.     right foot pain    S: Went off melaxopan last week and had increased pain.    HPI:  James Padilla is a 41 year old year old male who presents for evaluation of foot pain.    Pain location- dorsal midfoot  States he has had >1 gout attack in this area  2nd toe is feeling much better, no pain currently  Did yard work & aggravated R foot recently - swollen today    Past Medical & Surgical History:  No past medical history on file.   No past surgical history on file.   No family history on file.     Social History:  ?  History   Smoking Status     Current Every Day Smoker     Types: Cigarettes   Smokeless Tobacco     Never Used     History   Drug Use Not on file     Social History    Substance and Sexual Activity      Alcohol use: Not on file      Allergies:  ?   Allergies   Allergen Reactions     Ketoconazole Rash        Medications:    Current Outpatient Medications   Medication      allopurinol (ZYLOPRIM) 100 MG tablet     atenolol (TENORMIN) 25 MG tablet     indomethacin (INDOCIN) 50 MG capsule     meloxicam (MOBIC) 15 MG tablet     No current facility-administered medications for this visit.       Physical Exam:  ?  Vitals:  /80   Pulse 76   Wt 112 kg (247 lb)   BMI 36.48 kg/m     General:  WD/WN, in NAD.  A&O x3.  Dermatologic:    Skin is intact, open lesions absent.   Skin texture, turgor is normal.  Vascular:  Pulses palpable bilateral.  Digital capillary refill time normal bilateral.  Skin temperature is normal right.  Generalized edema- trace bilateral.  Focal edema- mild dorsal midfoot right.  Neurologic:    Gross sensation normal.  Gait and balance normal.  Musculoskeletal:  Moderate pain to palpation of dorsal midfoot over 2nd TMTJ,  Right.  Mild pain to palpation sub 5th MPJ, 2nd MPJ, R  2nd TMTJ ROM painful, R  2nd MPJ ROM mildly painful, R  Lachmann drawer stable, not painful, 2nd MPJ, R  Ankle dorsiflexion <10 degrees with the knee extended, <0 degrees with the knee flexed.  Muscle strength 5/5  foot and ankle right.      Stance:  RCSP Valgus bilateral.  Rectus hallux, mild 5th digit adductus      Imaging:   x-ray independently reviewed and interpreted by myself today.  Weight-bearing views right foot dated 4/2022, reveals 2nd & 3rd TMTJ arthritis.  1/2 DAMIAN increased despite rectus 1st MPJ.  Tailor's bunion present,     MRI R foot dated reveal metatarsus adductus & flatfoot w/ 2nd TMTJ degeneration > 3rd.  2nd digit rectus.  2nd MPJ effusion & attenuation of medial capsule.  2nd TMTJ subchondral edema, mild amt to 3rd TMTJ.              Again, thank you for allowing me to participate in the care of your patient.        Sincerely,        Solange Mccormack DPM

## 2022-07-22 NOTE — PATIENT INSTRUCTIONS
PATIENT INSTRUCTIONS - Podiatry / Foot & Ankle Surgery    Surgery Scheduling  Please call the surgery coordinator to schedule surgery at:  [phone]  The surgery coordinator will also arrange a pre-op exam with your PCP for surgical clearance.  Please return to see your surgeon for a pre-op exam within 30 days of your scheduled procedure.      Midfoot fusion, possible tailor's bunionectomy, 2nd MPJ repair   Recovery - 6 weeks no weight, then 2-4 weeks in a boot   No impact x3 months      Calf muscle stretching 2-3x daily as instructed, both with the knees straight and the knees bent. Hold for 30-60 seconds.  For personal instruction on this,  please request a Physical Therapy referral from your doctor.      Have orthotics adjusted as much as possible until they feel great

## 2022-07-22 NOTE — PROGRESS NOTES
Assessment:      ICD-10-CM    1. Arthritis of midfoot  M19.079    2. Capsulitis of metatarsophalangeal (MTP) joint of right foot  M77.51    3. Romeo's bunionette, right  M21.621         Plan:  No orders of the defined types were placed in this encounter.      Discussed the etiology and treatment of the condition with the patient.  Imaging studies reviewed and discussed with the patient.  Discussed surgical and conservative options.      Midfoot arthritis & prior gout attacks is priority  Romeo's bunion - on & off  2nd MPJ - came after midfoot    -Surgical - midfoot fusion, tailor's bunionectomy, 2nd MPJ repair / decompression discussed  -Continue current care        Return:  No follow-ups on file.    Solange Mccormack DPM                Chief Complaint:     No chief complaint on file.     right foot pain    S: Went off melaxopan last week and had increased pain.    HPI:  James Padilla is a 41 year old year old male who presents for evaluation of foot pain.    Pain location- dorsal midfoot  States he has had >1 gout attack in this area  2nd toe is feeling much better, no pain currently  Did yard work & aggravated R foot recently - swollen today    Past Medical & Surgical History:  No past medical history on file.   No past surgical history on file.   No family history on file.     Social History:  ?  History   Smoking Status     Current Every Day Smoker     Types: Cigarettes   Smokeless Tobacco     Never Used     History   Drug Use Not on file     Social History    Substance and Sexual Activity      Alcohol use: Not on file      Allergies:  ?   Allergies   Allergen Reactions     Ketoconazole Rash        Medications:    Current Outpatient Medications   Medication     allopurinol (ZYLOPRIM) 100 MG tablet     atenolol (TENORMIN) 25 MG tablet     indomethacin (INDOCIN) 50 MG capsule     meloxicam (MOBIC) 15 MG tablet     No current facility-administered medications for this visit.       Physical Exam:   ?  Vitals:  /80   Pulse 76   Wt 112 kg (247 lb)   BMI 36.48 kg/m     General:  WD/WN, in NAD.  A&O x3.  Dermatologic:    Skin is intact, open lesions absent.   Skin texture, turgor is normal.  Vascular:  Pulses palpable bilateral.  Digital capillary refill time normal bilateral.  Skin temperature is normal right.  Generalized edema- trace bilateral.  Focal edema- mild dorsal midfoot right.  Neurologic:    Gross sensation normal.  Gait and balance normal.  Musculoskeletal:  Moderate pain to palpation of dorsal midfoot over 2nd TMTJ,  Right.  Mild pain to palpation sub 5th MPJ, 2nd MPJ, R  2nd TMTJ ROM painful, R  2nd MPJ ROM mildly painful, R  Lachmann drawer stable, not painful, 2nd MPJ, R  Ankle dorsiflexion <10 degrees with the knee extended, <0 degrees with the knee flexed.  Muscle strength 5/5  foot and ankle right.      Stance:  RCSP Valgus bilateral.  Rectus hallux, mild 5th digit adductus      Imaging:   x-ray independently reviewed and interpreted by myself today.  Weight-bearing views right foot dated 4/2022, reveals 2nd & 3rd TMTJ arthritis.  1/2 DAMIAN increased despite rectus 1st MPJ.  Tailor's bunion present,     MRI R foot dated reveal metatarsus adductus & flatfoot w/ 2nd TMTJ degeneration > 3rd.  2nd digit rectus.  2nd MPJ effusion & attenuation of medial capsule.  2nd TMTJ subchondral edema, mild amt to 3rd TMTJ.

## 2022-10-03 ENCOUNTER — HEALTH MAINTENANCE LETTER (OUTPATIENT)
Age: 42
End: 2022-10-03

## 2022-10-25 ENCOUNTER — LAB (OUTPATIENT)
Dept: LAB | Facility: CLINIC | Age: 42
End: 2022-10-25
Payer: COMMERCIAL

## 2022-10-25 DIAGNOSIS — Z30.2 ENCOUNTER FOR STERILIZATION: ICD-10-CM

## 2022-10-25 LAB — SEMEN ANALYSIS P VAS PNL: NORMAL

## 2022-10-25 PROCEDURE — 89321 SEMEN ANAL SPERM DETECTION: CPT

## 2023-01-10 DIAGNOSIS — M19.079 ARTHRITIS OF MIDFOOT: ICD-10-CM

## 2023-01-17 ENCOUNTER — ANCILLARY PROCEDURE (OUTPATIENT)
Dept: GENERAL RADIOLOGY | Facility: CLINIC | Age: 43
End: 2023-01-17
Attending: PODIATRIST
Payer: COMMERCIAL

## 2023-01-17 ENCOUNTER — OFFICE VISIT (OUTPATIENT)
Dept: PODIATRY | Facility: CLINIC | Age: 43
End: 2023-01-17
Payer: COMMERCIAL

## 2023-01-17 VITALS
HEIGHT: 69 IN | SYSTOLIC BLOOD PRESSURE: 122 MMHG | BODY MASS INDEX: 36.58 KG/M2 | DIASTOLIC BLOOD PRESSURE: 79 MMHG | WEIGHT: 247 LBS

## 2023-01-17 DIAGNOSIS — M79.672 LEFT FOOT PAIN: ICD-10-CM

## 2023-01-17 DIAGNOSIS — M21.622 TAILOR'S BUNIONETTE, LEFT: Primary | ICD-10-CM

## 2023-01-17 DIAGNOSIS — M19.079 ARTHRITIS OF MIDFOOT: ICD-10-CM

## 2023-01-17 DIAGNOSIS — Q66.229 METATARSUS ADDUCTUS: ICD-10-CM

## 2023-01-17 PROCEDURE — 99214 OFFICE O/P EST MOD 30 MIN: CPT | Performed by: PODIATRIST

## 2023-01-17 PROCEDURE — 73630 X-RAY EXAM OF FOOT: CPT | Mod: TC | Performed by: RADIOLOGY

## 2023-01-17 RX ORDER — MELOXICAM 15 MG/1
TABLET ORAL
Qty: 28 TABLET | Refills: 0 | OUTPATIENT
Start: 2023-01-17

## 2023-01-17 NOTE — PATIENT INSTRUCTIONS
PATIENT INSTRUCTIONS - Podiatry / Foot & Ankle Surgery    Diclofenac / Voltaren - 1 pill twice daily x1 week.  Then take a 1 week break.  Repeat as needed.  Take with food & an acid blocker if stomach upset occurs.  Stop all other NSAIDs (aspirin, ibuprofen/Motrin, naproxen/ Aleve).      Have orthotics modified if not fully comfortable    [facility]  [facility]  [address]  [place] [address]  Phone: [phone]  Fax: [phone] [facility] [employer] [facility]  [address]  [place] [address]  Phone: [phone]   Fax: [phone]   [facility]  [address]  [place] [address]  Phone: [phone]  Fax: [phone] [facility]  [address]  [place] [address]  Phone: [phone]   Fax: [phone]   [facility]   [address]  [place] [address]  Phone: [phone]  Fax: [phone] * Please call any location listed to make an appointment for a casting/fitting. Your referral was sent to their central office and they will all have the order on file.           Low impact activity - cycling or swimming is best; then walking or elliptical.  Avoid running, jumping, and hard landings.   Walking, swimming is ok        I can inject joint or fascia - not forefoot joints

## 2023-01-17 NOTE — LETTER
1/17/2023         RE: James Padilla  [address] [facility] [address]        Dear Colleague,    Thank you for referring your patient, James Padilal, to the [facility]. Please see a copy of my visit note below.    Assessment:      ICD-10-CM    1. Romeo's bunionette, left  M21.622 diclofenac (VOLTAREN) 50 MG EC tablet      2. Arthritis of midfoot  M19.079 XR Foot Left G/E 3 Views     diclofenac (VOLTAREN) 50 MG EC tablet      3. Metatarsus adductus  Q66.229 diclofenac (VOLTAREN) 50 MG EC tablet           Plan:  Orders Placed This Encounter   Procedures     XR Foot Left G/E 3 Views       Discussed the etiology and treatment of the condition with the patient.  Imaging studies reviewed and discussed with the patient.  Discussed surgical and conservative options.    Romeo's bunionionette / lateral column pain today  Metatarsus adductus as cause of midfoot arthritis, lateral column pain discussed    -Rx diclofenac  -No good injection target today - not to 5th MTP  -Orthotics - has, have modified if needed    Midfoot metatarsus adductus fusion if needed      Return:  No follow-ups on file.    Solange Mccormack DPM                Chief Complaint:     Patient presents with:  Left Foot - Pain: Tender on top and difficult to put weight on the pinky toe area of pad (from previous visit it is similar almost like the right)     right foot pain        HPI:  James Padilla is a 41 year old year old male who presents for evaluation of foot pain.    1/17/23-  S: Went off meloxicam last week and had increased pain.  No gout flares to L foot  5th MTP pain on the bottom, radiates into 5th met  Hasn't worn orthotics recently; R foot got completely better      Pain location- dorsal midfoot  States he has had >1 gout attack in this area  2nd toe is feeling much better, no pain currently  Did yard work & aggravated R foot recently - swollen today    Past Medical & Surgical History:  No  "past medical history on file.   No past surgical history on file.   No family history on file.     Social History:  ?  History   Smoking Status     Every Day     Types: Cigarettes   Smokeless Tobacco     Never     History   Drug Use Not on file     Social History    Substance and Sexual Activity      Alcohol use: Not on file      Allergies:  ?   Allergies   Allergen Reactions     Ketoconazole Rash        Medications:    Current Outpatient Medications   Medication     diclofenac (VOLTAREN) 50 MG EC tablet     allopurinol (ZYLOPRIM) 100 MG tablet     atenolol (TENORMIN) 25 MG tablet     indomethacin (INDOCIN) 50 MG capsule     meloxicam (MOBIC) 15 MG tablet     No current facility-administered medications for this visit.       Physical Exam:  ?  Vitals:  /79   Ht 1.753 m (5' 9\")   Wt 112 kg (247 lb)   BMI 36.48 kg/m     General:  WD/WN, in NAD.  A&O x3.  Dermatologic:    Skin is intact, open lesions absent.   Skin texture, turgor is normal.  Vascular:  Pulses palpable bilateral.  Digital capillary refill time normal bilateral.  Skin temperature is normal right.  Generalized edema- trace bilateral.  Focal edema- mild dorsal midfoot right.  Neurologic:    Gross sensation normal.  Gait and balance normal.  Musculoskeletal:  Max pain to palpation 5th met shaft & 5th MTP      Moderate pain to palpation of dorsal midfoot over 2nd TMTJ,  Right.  Mild pain to palpation sub 5th MPJ, 2nd MPJ, R  2nd TMTJ ROM painful, R  2nd MPJ ROM mildly painful, R  Lachmann drawer stable, not painful, 2nd MPJ, R  Ankle dorsiflexion <10 degrees with the knee extended, <0 degrees with the knee flexed.  Muscle strength 5/5  foot and ankle right.      Stance:  RCSP Valgus bilateral.  Rectus hallux, mild 5th digit adductus      Imaging:   x-ray independently reviewed and interpreted by myself today.  Weight-bearing views left foot dated 01/17/23, reveal metatarsus adductus of 35 deg, increased 4/5 DAMIAN as result of metatarsus " adductus.  Early arthritis 2nd, 3rd TMTJs  Flatfoot - compensated metatarsus adductus      x-ray independently reviewed and interpreted by myself today.  Weight-bearing views right foot dated 4/2022, reveals 2nd & 3rd TMTJ arthritis.  1/2 DAMIAN increased despite rectus 1st MPJ.  Tailor's bunion present,     MRI R foot dated reveal metatarsus adductus & flatfoot w/ 2nd TMTJ degeneration > 3rd.  2nd digit rectus.  2nd MPJ effusion & attenuation of medial capsule.  2nd TMTJ subchondral edema, mild amt to 3rd TMTJ.            Again, thank you for allowing me to participate in the care of your patient.        Sincerely,        Solange Mccormack DPM

## 2023-01-17 NOTE — PROGRESS NOTES
Assessment:      ICD-10-CM    1. Romeo's bunionette, left  M21.622 diclofenac (VOLTAREN) 50 MG EC tablet      2. Arthritis of midfoot  M19.079 XR Foot Left G/E 3 Views     diclofenac (VOLTAREN) 50 MG EC tablet      3. Metatarsus adductus  Q66.229 diclofenac (VOLTAREN) 50 MG EC tablet           Plan:  Orders Placed This Encounter   Procedures     XR Foot Left G/E 3 Views       Discussed the etiology and treatment of the condition with the patient.  Imaging studies reviewed and discussed with the patient.  Discussed surgical and conservative options.    Romeo's bunionionette / lateral column pain today  Metatarsus adductus as cause of midfoot arthritis, lateral column pain discussed    -Rx diclofenac  -No good injection target today - not to 5th MTP  -Orthotics - has, have modified if needed    Midfoot metatarsus adductus fusion if needed      Return:  No follow-ups on file.    Solange Mccormack DPM                Chief Complaint:     Patient presents with:  Left Foot - Pain: Tender on top and difficult to put weight on the pinky toe area of pad (from previous visit it is similar almost like the right)     right foot pain        HPI:  James Padilla is a 41 year old year old male who presents for evaluation of foot pain.    1/17/23-  S: Went off meloxicam last week and had increased pain.  No gout flares to L foot  5th MTP pain on the bottom, radiates into 5th met  Hasn't worn orthotics recently; R foot got completely better      Pain location- dorsal midfoot  States he has had >1 gout attack in this area  2nd toe is feeling much better, no pain currently  Did yard work & aggravated R foot recently - swollen today    Past Medical & Surgical History:  No past medical history on file.   No past surgical history on file.   No family history on file.     Social History:  ?  History   Smoking Status     Every Day     Types: Cigarettes   Smokeless Tobacco     Never     History   Drug Use Not on file     Social  "History    Substance and Sexual Activity      Alcohol use: Not on file      Allergies:  ?   Allergies   Allergen Reactions     Ketoconazole Rash        Medications:    Current Outpatient Medications   Medication     diclofenac (VOLTAREN) 50 MG EC tablet     allopurinol (ZYLOPRIM) 100 MG tablet     atenolol (TENORMIN) 25 MG tablet     indomethacin (INDOCIN) 50 MG capsule     meloxicam (MOBIC) 15 MG tablet     No current facility-administered medications for this visit.       Physical Exam:  ?  Vitals:  /79   Ht 1.753 m (5' 9\")   Wt 112 kg (247 lb)   BMI 36.48 kg/m     General:  WD/WN, in NAD.  A&O x3.  Dermatologic:    Skin is intact, open lesions absent.   Skin texture, turgor is normal.  Vascular:  Pulses palpable bilateral.  Digital capillary refill time normal bilateral.  Skin temperature is normal right.  Generalized edema- trace bilateral.  Focal edema- mild dorsal midfoot right.  Neurologic:    Gross sensation normal.  Gait and balance normal.  Musculoskeletal:  Max pain to palpation 5th met shaft & 5th MTP      Moderate pain to palpation of dorsal midfoot over 2nd TMTJ,  Right.  Mild pain to palpation sub 5th MPJ, 2nd MPJ, R  2nd TMTJ ROM painful, R  2nd MPJ ROM mildly painful, R  Lachmann drawer stable, not painful, 2nd MPJ, R  Ankle dorsiflexion <10 degrees with the knee extended, <0 degrees with the knee flexed.  Muscle strength 5/5  foot and ankle right.      Stance:  RCSP Valgus bilateral.  Rectus hallux, mild 5th digit adductus      Imaging:   x-ray independently reviewed and interpreted by myself today.  Weight-bearing views left foot dated 01/17/23, reveal metatarsus adductus of 35 deg, increased 4/5 DAMIAN as result of metatarsus adductus.  Early arthritis 2nd, 3rd TMTJs  Flatfoot - compensated metatarsus adductus      x-ray independently reviewed and interpreted by myself today.  Weight-bearing views right foot dated 4/2022, reveals 2nd & 3rd TMTJ arthritis.  1/2 DAMIAN increased despite rectus " 1st MPJ.  Tailor's bunion present,     MRI R foot dated reveal metatarsus adductus & flatfoot w/ 2nd TMTJ degeneration > 3rd.  2nd digit rectus.  2nd MPJ effusion & attenuation of medial capsule.  2nd TMTJ subchondral edema, mild amt to 3rd TMTJ.

## 2023-01-17 NOTE — TELEPHONE ENCOUNTER
Patient was seen by Dr. Mccormack today and was prescribed Diclofenac instead.     Refill denied.     JAIDEN Ferraro RN

## 2023-02-12 ENCOUNTER — HEALTH MAINTENANCE LETTER (OUTPATIENT)
Age: 43
End: 2023-02-12

## 2024-03-10 ENCOUNTER — HEALTH MAINTENANCE LETTER (OUTPATIENT)
Age: 44
End: 2024-03-10

## 2025-03-16 ENCOUNTER — HEALTH MAINTENANCE LETTER (OUTPATIENT)
Age: 45
End: 2025-03-16

## 2025-08-06 ENCOUNTER — TRANSCRIBE ORDERS (OUTPATIENT)
Dept: OTHER | Age: 45
End: 2025-08-06

## 2025-08-06 DIAGNOSIS — M25.511 SHOULDER PAIN, RIGHT: Primary | ICD-10-CM

## 2025-08-07 ENCOUNTER — THERAPY VISIT (OUTPATIENT)
Dept: PHYSICAL THERAPY | Facility: CLINIC | Age: 45
End: 2025-08-07
Payer: COMMERCIAL

## 2025-08-07 DIAGNOSIS — M25.511 CHRONIC RIGHT SHOULDER PAIN: Primary | ICD-10-CM

## 2025-08-07 DIAGNOSIS — G89.29 CHRONIC RIGHT SHOULDER PAIN: Primary | ICD-10-CM

## 2025-08-07 ASSESSMENT — ACTIVITIES OF DAILY LIVING (ADL)
PUSHING_WITH_THE_INVOLVED_ARM: 4
PUTTING_ON_YOUR_PANTS: 0
WASHING_YOUR_BACK: 3
PUTTING_ON_A_SHIRT_THAT_BUTTONS_DOWN_THE_FRONT: 0
REACHING_FOR_SOMETHING_ON_A_HIGH_SHELF: 4
PLEASE_INDICATE_YOR_PRIMARY_REASON_FOR_REFERRAL_TO_THERAPY:: SHOULDER
WASHING_YOUR_HAIR?: 2
CARRYING_A_HEAVY_OBJECT_OF_10_POUNDS: 2
WHEN_LYING_ON_THE_INVOLVED_SIDE: 6
PUTTING_ON_AN_UNDERSHIRT_OR_A_PULLOVER_SWEATER: 3
PLACING_AN_OBJECT_ON_A_HIGH_SHELF: 4
AT_ITS_WORST?: 5
REMOVING_SOMETHING_FROM_YOUR_BACK_POCKET: 0